# Patient Record
Sex: MALE | Race: WHITE | NOT HISPANIC OR LATINO | Employment: UNEMPLOYED | URBAN - METROPOLITAN AREA
[De-identification: names, ages, dates, MRNs, and addresses within clinical notes are randomized per-mention and may not be internally consistent; named-entity substitution may affect disease eponyms.]

---

## 2021-01-28 ENCOUNTER — NEW PATIENT COMPREHENSIVE (OUTPATIENT)
Dept: URBAN - METROPOLITAN AREA CLINIC 42 | Facility: CLINIC | Age: 56
End: 2021-01-28

## 2021-01-28 DIAGNOSIS — H52.4: ICD-10-CM

## 2021-01-28 DIAGNOSIS — H52.223: ICD-10-CM

## 2021-01-28 DIAGNOSIS — H52.03: ICD-10-CM

## 2021-01-28 PROCEDURE — 92004 COMPRE OPH EXAM NEW PT 1/>: CPT

## 2021-01-28 PROCEDURE — 92015 DETERMINE REFRACTIVE STATE: CPT

## 2021-01-28 ASSESSMENT — KERATOMETRY
OD_AXISANGLE_DEGREES: 010
OD_AXISANGLE2_DEGREES: 100
OS_AXISANGLE_DEGREES: 012
OS_K2POWER_DIOPTERS: 47.25
OS_AXISANGLE2_DEGREES: 102
OS_K1POWER_DIOPTERS: 46.00
OD_K1POWER_DIOPTERS: 45.50
OD_K2POWER_DIOPTERS: 46.50

## 2021-01-28 ASSESSMENT — TONOMETRY
OD_IOP_MMHG: 12
OS_IOP_MMHG: 14

## 2021-01-28 ASSESSMENT — VISUAL ACUITY
OU_SC: 20/50
OS_SC: 20/70-1
OU_SC: 20/20
OD_SC: 20/100
OS_SC: 20/25
OD_SC: 20/25

## 2021-10-04 NOTE — PATIENT DISCUSSION
Educated patient on findings and condition. Prescribe artificial tears BID-QID OU, warm compresses QD-BID OU, and eyelid scrubs QD-BID OU. Advised to call or RTC if si/sx persist or worsen - will consider short dose of Lotemax. Monitor.

## 2021-10-04 NOTE — PATIENT DISCUSSION
Educated patient on findings. Prescribe OTC Pataday BID/prn OU. Discussed no eye rubbing. Advised to RTC if si/sx persist or worsen. Monitor.

## 2021-11-01 NOTE — PATIENT DISCUSSION
Educated patient on findings and condition. Improving. Continue warm compresses and eyelid scrubs QD-BID OU and increase artificial tears to QID/prn OU (recommend preservative free). Begin taper of Lotemax - starting tomorrow BID OU x 1 week then discontinue. Discussed good visual hygiene including conscious blinking and avoiding irritants (hairspray, etc). Advised to RTC if si/sx persist or worsen. Monitor.

## 2021-11-01 NOTE — PATIENT DISCUSSION
Educated patient on findings. Greatly improved. Continue OTC Pataday BID/prn OU. Discussed no eye rubbing. Advised to RTC if si/sx persist or worsen. Monitor.

## 2022-03-11 ENCOUNTER — ESTABLISHED PATIENT (OUTPATIENT)
Dept: URBAN - METROPOLITAN AREA CLINIC 42 | Facility: CLINIC | Age: 57
End: 2022-03-11

## 2022-03-11 DIAGNOSIS — H52.4: ICD-10-CM

## 2022-03-11 DIAGNOSIS — H52.223: ICD-10-CM

## 2022-03-11 DIAGNOSIS — H52.03: ICD-10-CM

## 2022-03-11 PROCEDURE — 92015 DETERMINE REFRACTIVE STATE: CPT

## 2022-03-11 PROCEDURE — 92014 COMPRE OPH EXAM EST PT 1/>: CPT

## 2022-03-11 ASSESSMENT — KERATOMETRY
OS_AXISANGLE_DEGREES: 010
OD_AXISANGLE2_DEGREES: 100
OD_AXISANGLE_DEGREES: 177
OS_AXISANGLE2_DEGREES: 102
OS_AXISANGLE_DEGREES: 012
OD_K2POWER_DIOPTERS: 46.50
OD_AXISANGLE_DEGREES: 010
OS_K2POWER_DIOPTERS: 47.50
OS_AXISANGLE2_DEGREES: 100
OS_K1POWER_DIOPTERS: 46.00
OD_K1POWER_DIOPTERS: 45.75
OD_AXISANGLE2_DEGREES: 087
OS_K2POWER_DIOPTERS: 47.25
OD_K1POWER_DIOPTERS: 45.50
OD_K2POWER_DIOPTERS: 46.75
OS_K1POWER_DIOPTERS: 46.25

## 2022-03-11 ASSESSMENT — VISUAL ACUITY
OS_SC: 20/100+2
OU_SC: 20/25-2
OD_SC: 20/30-1
OD_SC: 20/100
OS_SC: 20/25+2
OU_SC: 20/50+2

## 2022-03-11 ASSESSMENT — TONOMETRY
OD_IOP_MMHG: 10
OS_IOP_MMHG: 12

## 2023-12-05 ENCOUNTER — COMPREHENSIVE EXAM (OUTPATIENT)
Dept: URBAN - METROPOLITAN AREA CLINIC 42 | Facility: CLINIC | Age: 58
End: 2023-12-05

## 2023-12-05 ENCOUNTER — DIAGNOSTICS ONLY (OUTPATIENT)
Dept: URBAN - METROPOLITAN AREA CLINIC 42 | Facility: CLINIC | Age: 58
End: 2023-12-05

## 2023-12-05 DIAGNOSIS — H52.223: ICD-10-CM

## 2023-12-05 DIAGNOSIS — H52.03: ICD-10-CM

## 2023-12-05 DIAGNOSIS — H02.834: ICD-10-CM

## 2023-12-05 DIAGNOSIS — H52.4: ICD-10-CM

## 2023-12-05 DIAGNOSIS — H02.831: ICD-10-CM

## 2023-12-05 DIAGNOSIS — Z01.00: ICD-10-CM

## 2023-12-05 PROCEDURE — 92083 EXTENDED VISUAL FIELD XM: CPT

## 2023-12-05 PROCEDURE — 92015 DETERMINE REFRACTIVE STATE: CPT

## 2023-12-05 PROCEDURE — 92014 COMPRE OPH EXAM EST PT 1/>: CPT

## 2023-12-05 ASSESSMENT — VISUAL ACUITY
OD_SC: 20/30
OU_SC: 20/25
OS_SC: 20/70
OS_SC: 20/25
OD_SC: 20/70
OU_SC: 20/70

## 2023-12-05 ASSESSMENT — KERATOMETRY
OD_K1POWER_DIOPTERS: 46.00
OD_AXISANGLE_DEGREES: 177
OS_AXISANGLE2_DEGREES: 19
OD_AXISANGLE_DEGREES: 010
OS_K1POWER_DIOPTERS: 47.00
OS_K1POWER_DIOPTERS: 46.00
OS_AXISANGLE2_DEGREES: 19
OS_K1POWER_DIOPTERS: 46.25
OD_AXISANGLE2_DEGREES: 165
OD_AXISANGLE2_DEGREES: 087
OD_AXISANGLE2_DEGREES: 165
OS_K2POWER_DIOPTERS: 47.50
OS_K1POWER_DIOPTERS: 46.25
OD_K2POWER_DIOPTERS: 46.50
OS_K1POWER_DIOPTERS: 46.00
OS_K1POWER_DIOPTERS: 47.00
OS_AXISANGLE2_DEGREES: 100
OS_K2POWER_DIOPTERS: 47.25
OD_K1POWER_DIOPTERS: 45.50
OS_AXISANGLE_DEGREES: 010
OD_AXISANGLE_DEGREES: 010
OS_K2POWER_DIOPTERS: 46.50
OS_K2POWER_DIOPTERS: 47.25
OD_K2POWER_DIOPTERS: 46.50
OD_K1POWER_DIOPTERS: 45.75
OD_AXISANGLE2_DEGREES: 100
OD_K1POWER_DIOPTERS: 46.00
OD_K1POWER_DIOPTERS: 45.75
OD_AXISANGLE_DEGREES: 177
OS_AXISANGLE_DEGREES: 109
OS_AXISANGLE_DEGREES: 012
OD_AXISANGLE_DEGREES: 75
OS_AXISANGLE2_DEGREES: 102
OS_K2POWER_DIOPTERS: 46.50
OS_K2POWER_DIOPTERS: 47.50
OD_AXISANGLE2_DEGREES: 100
OS_AXISANGLE_DEGREES: 109
OS_AXISANGLE_DEGREES: 012
OD_K1POWER_DIOPTERS: 45.50
OD_AXISANGLE2_DEGREES: 087
OD_K2POWER_DIOPTERS: 45.75
OD_AXISANGLE_DEGREES: 75
OS_AXISANGLE_DEGREES: 010
OD_K2POWER_DIOPTERS: 45.75
OD_K2POWER_DIOPTERS: 46.75
OD_K2POWER_DIOPTERS: 46.75
OS_AXISANGLE2_DEGREES: 102
OS_AXISANGLE2_DEGREES: 100

## 2023-12-05 ASSESSMENT — TONOMETRY
OD_IOP_MMHG: 10
OS_IOP_MMHG: 11

## 2024-01-02 ENCOUNTER — ESTABLISHED PATIENT (OUTPATIENT)
Dept: URBAN - METROPOLITAN AREA CLINIC 44 | Facility: CLINIC | Age: 59
End: 2024-01-02

## 2024-01-02 VITALS — BODY MASS INDEX: 28.32 KG/M2 | HEIGHT: 65 IN | WEIGHT: 170 LBS

## 2024-01-02 DIAGNOSIS — H02.832: ICD-10-CM

## 2024-01-02 DIAGNOSIS — H02.834: ICD-10-CM

## 2024-01-02 DIAGNOSIS — H02.831: ICD-10-CM

## 2024-01-02 DIAGNOSIS — L98.8: ICD-10-CM

## 2024-01-02 DIAGNOSIS — H57.813: ICD-10-CM

## 2024-01-02 DIAGNOSIS — H02.835: ICD-10-CM

## 2024-01-02 PROCEDURE — 99214 OFFICE O/P EST MOD 30 MIN: CPT

## 2024-01-02 PROCEDURE — 92285 EXTERNAL OCULAR PHOTOGRAPHY: CPT

## 2024-01-02 ASSESSMENT — KERATOMETRY
OD_K1POWER_DIOPTERS: 45.50
OD_K1POWER_DIOPTERS: 46.00
OS_AXISANGLE_DEGREES: 010
OD_K2POWER_DIOPTERS: 46.75
OD_AXISANGLE2_DEGREES: 100
OS_AXISANGLE2_DEGREES: 102
OD_K1POWER_DIOPTERS: 45.75
OS_K1POWER_DIOPTERS: 47.00
OS_K2POWER_DIOPTERS: 47.25
OD_AXISANGLE2_DEGREES: 165
OS_K2POWER_DIOPTERS: 47.50
OS_K2POWER_DIOPTERS: 46.50
OD_AXISANGLE_DEGREES: 177
OS_AXISANGLE2_DEGREES: 19
OS_K1POWER_DIOPTERS: 46.25
OD_AXISANGLE_DEGREES: 75
OD_AXISANGLE_DEGREES: 010
OD_K2POWER_DIOPTERS: 45.75
OS_K1POWER_DIOPTERS: 46.00
OS_AXISANGLE_DEGREES: 012
OS_AXISANGLE2_DEGREES: 100
OD_K2POWER_DIOPTERS: 46.50
OD_AXISANGLE2_DEGREES: 087
OS_AXISANGLE_DEGREES: 109

## 2024-01-02 ASSESSMENT — VISUAL ACUITY
OD_SC: 20/30
OS_SC: 20/25